# Patient Record
Sex: FEMALE | Race: WHITE | Employment: PART TIME | URBAN - NONMETROPOLITAN AREA
[De-identification: names, ages, dates, MRNs, and addresses within clinical notes are randomized per-mention and may not be internally consistent; named-entity substitution may affect disease eponyms.]

---

## 2023-03-11 ENCOUNTER — APPOINTMENT (OUTPATIENT)
Dept: CT IMAGING | Age: 23
End: 2023-03-11
Payer: COMMERCIAL

## 2023-03-11 ENCOUNTER — HOSPITAL ENCOUNTER (EMERGENCY)
Age: 23
Discharge: HOME | End: 2023-03-11
Payer: COMMERCIAL

## 2023-03-11 ENCOUNTER — HOSPITAL ENCOUNTER (EMERGENCY)
Facility: HOSPITAL | Age: 23
Discharge: LEFT BEFORE DISCHARGE | End: 2023-03-11
Payer: COMMERCIAL

## 2023-03-11 VITALS
OXYGEN SATURATION: 98 % | HEART RATE: 70 BPM | SYSTOLIC BLOOD PRESSURE: 116 MMHG | WEIGHT: 200 LBS | DIASTOLIC BLOOD PRESSURE: 69 MMHG | HEIGHT: 60 IN | BODY MASS INDEX: 39.27 KG/M2 | TEMPERATURE: 98.5 F | RESPIRATION RATE: 18 BRPM

## 2023-03-11 DIAGNOSIS — K59.00 CONSTIPATION, UNSPECIFIED CONSTIPATION TYPE: Primary | ICD-10-CM

## 2023-03-11 DIAGNOSIS — N83.202 CYSTS OF BOTH OVARIES: ICD-10-CM

## 2023-03-11 DIAGNOSIS — N83.201 CYSTS OF BOTH OVARIES: ICD-10-CM

## 2023-03-11 LAB
ADENOVIRUS F 40 41 PCR: NOT DETECTED
ALBUMIN SERPL-MCNC: 4.5 G/DL (ref 3.5–5.2)
ALP BLD-CCNC: 79 U/L (ref 35–104)
ALT SERPL-CCNC: 16 U/L (ref 5–33)
ANION GAP SERPL CALCULATED.3IONS-SCNC: 11 MMOL/L (ref 7–19)
AST SERPL-CCNC: 15 U/L (ref 5–32)
ASTROVIRUS PCR: NOT DETECTED
BACTERIA: NEGATIVE /HPF
BASOPHILS ABSOLUTE: 0 K/UL (ref 0–0.2)
BASOPHILS RELATIVE PERCENT: 0.4 % (ref 0–1)
BILIRUB SERPL-MCNC: 0.5 MG/DL (ref 0.2–1.2)
BILIRUBIN URINE: NEGATIVE
BLOOD, URINE: ABNORMAL
BUN BLDV-MCNC: 9 MG/DL (ref 6–20)
CALCIUM SERPL-MCNC: 9.6 MG/DL (ref 8.6–10)
CAMPYLOBACTER PCR: NOT DETECTED
CHLORIDE BLD-SCNC: 102 MMOL/L (ref 98–111)
CLARITY: ABNORMAL
CLOSTRIDIUM DIFFICILE, PCR: NOT DETECTED
CO2: 23 MMOL/L (ref 22–29)
COLOR: YELLOW
CREAT SERPL-MCNC: 0.7 MG/DL (ref 0.5–0.9)
CRYPTOSPORIDIUM PCR: NOT DETECTED
CRYSTALS, UA: ABNORMAL /HPF
CYCLOSPORA CAYETANENSIS PCR: NOT DETECTED
E COLI ENTEROAGGREGATIVE PCR: NOT DETECTED
E COLI ENTEROPATHOGENIC PCR: NOT DETECTED
E COLI ENTEROTOXIGENIC PCR: NOT DETECTED
E COLI SHIGELLA/ENTEROINVASIVE PCR: NOT DETECTED
ENTAMOEBA HISTOLYTICA PCR: NOT DETECTED
EOSINOPHILS ABSOLUTE: 0.1 K/UL (ref 0–0.6)
EOSINOPHILS RELATIVE PERCENT: 0.9 % (ref 0–5)
EPITHELIAL CELLS, UA: 17 /HPF (ref 0–5)
GFR SERPL CREATININE-BSD FRML MDRD: >60 ML/MIN/{1.73_M2}
GIARDIA LAMBLIA PCR: NOT DETECTED
GLUCOSE BLD-MCNC: 101 MG/DL (ref 74–109)
GLUCOSE URINE: NEGATIVE MG/DL
HCG QUALITATIVE: NEGATIVE
HCT VFR BLD CALC: 41.9 % (ref 37–47)
HEMOGLOBIN: 13.7 G/DL (ref 12–16)
HYALINE CASTS: 14 /HPF (ref 0–8)
IMMATURE GRANULOCYTES #: 0 K/UL
KETONES, URINE: ABNORMAL MG/DL
LEUKOCYTE ESTERASE, URINE: ABNORMAL
LIPASE: 19 U/L (ref 13–60)
LYMPHOCYTES ABSOLUTE: 1.9 K/UL (ref 1.1–4.5)
LYMPHOCYTES RELATIVE PERCENT: 28.1 % (ref 20–40)
MCH RBC QN AUTO: 28.5 PG (ref 27–31)
MCHC RBC AUTO-ENTMCNC: 32.7 G/DL (ref 33–37)
MCV RBC AUTO: 87.3 FL (ref 81–99)
MONOCYTES ABSOLUTE: 0.5 K/UL (ref 0–0.9)
MONOCYTES RELATIVE PERCENT: 7.7 % (ref 0–10)
NEUTROPHILS ABSOLUTE: 4.2 K/UL (ref 1.5–7.5)
NEUTROPHILS RELATIVE PERCENT: 62.6 % (ref 50–65)
NITRITE, URINE: NEGATIVE
NOROVIRUS GI GII PCR: NOT DETECTED
PDW BLD-RTO: 12.7 % (ref 11.5–14.5)
PH UA: 5.5 (ref 5–8)
PLATELET # BLD: 366 K/UL (ref 130–400)
PLESIOMONAS SHIGELLOIDES PCR: NOT DETECTED
PMV BLD AUTO: 9.6 FL (ref 9.4–12.3)
POTASSIUM REFLEX MAGNESIUM: 4 MMOL/L (ref 3.5–5)
PROTEIN UA: ABNORMAL MG/DL
RBC # BLD: 4.8 M/UL (ref 4.2–5.4)
RBC UA: 6 /HPF (ref 0–4)
ROTAVIRUS A PCR: NOT DETECTED
SALMONELLA PCR: NOT DETECTED
SAPOVIRUS PCR: NOT DETECTED
SHIGA-LIKE TOXIN-PRODUCING E. COLI (STEC) STX1/STX2: NOT DETECTED
SODIUM BLD-SCNC: 136 MMOL/L (ref 136–145)
SPECIFIC GRAVITY UA: 1.03 (ref 1–1.03)
TOTAL PROTEIN: 8.2 G/DL (ref 6.6–8.7)
UROBILINOGEN, URINE: 1 E.U./DL
VIBRIO CHOLERAE PCR: NOT DETECTED
VIBRIO PCR: NOT DETECTED
WBC # BLD: 6.7 K/UL (ref 4.8–10.8)
WBC UA: 11 /HPF (ref 0–5)
YERSINIA ENTEROCOLITICA PCR: NOT DETECTED

## 2023-03-11 PROCEDURE — 85025 COMPLETE CBC W/AUTO DIFF WBC: CPT

## 2023-03-11 PROCEDURE — 87086 URINE CULTURE/COLONY COUNT: CPT

## 2023-03-11 PROCEDURE — 99211 OFF/OP EST MAY X REQ PHY/QHP: CPT

## 2023-03-11 PROCEDURE — 81001 URINALYSIS AUTO W/SCOPE: CPT

## 2023-03-11 PROCEDURE — 87507 IADNA-DNA/RNA PROBE TQ 12-25: CPT

## 2023-03-11 PROCEDURE — 83690 ASSAY OF LIPASE: CPT

## 2023-03-11 PROCEDURE — 96374 THER/PROPH/DIAG INJ IV PUSH: CPT

## 2023-03-11 PROCEDURE — 99285 EMERGENCY DEPT VISIT HI MDM: CPT

## 2023-03-11 PROCEDURE — 84703 CHORIONIC GONADOTROPIN ASSAY: CPT

## 2023-03-11 PROCEDURE — 74177 CT ABD & PELVIS W/CONTRAST: CPT

## 2023-03-11 PROCEDURE — 6360000004 HC RX CONTRAST MEDICATION: Performed by: PHYSICIAN ASSISTANT

## 2023-03-11 PROCEDURE — 6370000000 HC RX 637 (ALT 250 FOR IP): Performed by: PHYSICIAN ASSISTANT

## 2023-03-11 PROCEDURE — 36415 COLL VENOUS BLD VENIPUNCTURE: CPT

## 2023-03-11 PROCEDURE — 6360000002 HC RX W HCPCS: Performed by: PHYSICIAN ASSISTANT

## 2023-03-11 PROCEDURE — 80053 COMPREHEN METABOLIC PANEL: CPT

## 2023-03-11 RX ORDER — BISACODYL 10 MG
10 SUPPOSITORY, RECTAL RECTAL ONCE
Status: COMPLETED | OUTPATIENT
Start: 2023-03-11 | End: 2023-03-11

## 2023-03-11 RX ORDER — KETOROLAC TROMETHAMINE 30 MG/ML
30 INJECTION, SOLUTION INTRAMUSCULAR; INTRAVENOUS ONCE
Status: COMPLETED | OUTPATIENT
Start: 2023-03-11 | End: 2023-03-11

## 2023-03-11 RX ORDER — METOCLOPRAMIDE 10 MG/1
10 TABLET ORAL 4 TIMES DAILY
Qty: 120 TABLET | Refills: 3 | Status: SHIPPED | OUTPATIENT
Start: 2023-03-11 | End: 2023-03-12 | Stop reason: SDUPTHER

## 2023-03-11 RX ORDER — KETOROLAC TROMETHAMINE 10 MG/1
10 TABLET, FILM COATED ORAL EVERY 6 HOURS PRN
Qty: 20 TABLET | Refills: 0 | Status: SHIPPED | OUTPATIENT
Start: 2023-03-11 | End: 2023-03-12 | Stop reason: SDUPTHER

## 2023-03-11 RX ORDER — HYDROCORTISONE ACETATE 25 MG/1
25 SUPPOSITORY RECTAL 2 TIMES DAILY
Status: DISCONTINUED | OUTPATIENT
Start: 2023-03-11 | End: 2023-03-12 | Stop reason: HOSPADM

## 2023-03-11 RX ORDER — POLYETHYLENE GLYCOL 3350 17 G/17G
17 POWDER, FOR SOLUTION ORAL DAILY PRN
Qty: 527 G | Refills: 1 | Status: SHIPPED | OUTPATIENT
Start: 2023-03-11 | End: 2023-03-12 | Stop reason: SDUPTHER

## 2023-03-11 RX ADMIN — HYDROCORTISONE ACETATE 25 MG: 25 SUPPOSITORY RECTAL at 19:54

## 2023-03-11 RX ADMIN — BISACODYL 10 MG: 10 SUPPOSITORY RECTAL at 20:27

## 2023-03-11 RX ADMIN — IOPAMIDOL 90 ML: 755 INJECTION, SOLUTION INTRAVENOUS at 18:45

## 2023-03-11 RX ADMIN — KETOROLAC TROMETHAMINE 30 MG: 30 INJECTION, SOLUTION INTRAMUSCULAR at 19:34

## 2023-03-11 ASSESSMENT — PAIN SCALES - GENERAL: PAINLEVEL_OUTOF10: 10

## 2023-03-11 ASSESSMENT — ENCOUNTER SYMPTOMS
COLOR CHANGE: 0
RHINORRHEA: 0
SHORTNESS OF BREATH: 0
NAUSEA: 0
BLOOD IN STOOL: 1
COUGH: 0
ABDOMINAL PAIN: 1
SORE THROAT: 0
APNEA: 0
EYE PAIN: 0
EYE DISCHARGE: 0
PHOTOPHOBIA: 0
ABDOMINAL DISTENTION: 0
BACK PAIN: 0

## 2023-03-11 ASSESSMENT — PAIN DESCRIPTION - DESCRIPTORS: DESCRIPTORS: ACHING;BURNING;SHOOTING

## 2023-03-11 ASSESSMENT — PAIN DESCRIPTION - LOCATION: LOCATION: ABDOMEN

## 2023-03-11 ASSESSMENT — PAIN DESCRIPTION - ORIENTATION: ORIENTATION: LOWER

## 2023-03-11 ASSESSMENT — PAIN DESCRIPTION - FREQUENCY: FREQUENCY: CONTINUOUS

## 2023-03-11 ASSESSMENT — PAIN - FUNCTIONAL ASSESSMENT: PAIN_FUNCTIONAL_ASSESSMENT: 0-10

## 2023-03-11 ASSESSMENT — PAIN DESCRIPTION - PAIN TYPE: TYPE: ACUTE PAIN

## 2023-03-12 LAB — URINE CULTURE, ROUTINE: NORMAL

## 2023-03-12 RX ORDER — POLYETHYLENE GLYCOL 3350 17 G/17G
17 POWDER, FOR SOLUTION ORAL DAILY PRN
Qty: 527 G | Refills: 1 | Status: SHIPPED | OUTPATIENT
Start: 2023-03-12 | End: 2023-04-11

## 2023-03-12 RX ORDER — METOCLOPRAMIDE 10 MG/1
10 TABLET ORAL 4 TIMES DAILY
Qty: 120 TABLET | Refills: 3 | Status: SHIPPED | OUTPATIENT
Start: 2023-03-12 | End: 2023-03-12 | Stop reason: SDUPTHER

## 2023-03-12 RX ORDER — KETOROLAC TROMETHAMINE 10 MG/1
10 TABLET, FILM COATED ORAL EVERY 6 HOURS PRN
Qty: 20 TABLET | Refills: 0 | Status: SHIPPED | OUTPATIENT
Start: 2023-03-12

## 2023-03-12 RX ORDER — METOCLOPRAMIDE 10 MG/1
10 TABLET ORAL 4 TIMES DAILY
Qty: 120 TABLET | Refills: 3 | Status: SHIPPED | OUTPATIENT
Start: 2023-03-12

## 2023-03-12 RX ORDER — KETOROLAC TROMETHAMINE 10 MG/1
10 TABLET, FILM COATED ORAL EVERY 6 HOURS PRN
Qty: 20 TABLET | Refills: 0 | Status: SHIPPED | OUTPATIENT
Start: 2023-03-12 | End: 2023-03-12 | Stop reason: SDUPTHER

## 2023-03-12 RX ORDER — POLYETHYLENE GLYCOL 3350 17 G/17G
17 POWDER, FOR SOLUTION ORAL DAILY PRN
Qty: 527 G | Refills: 1 | Status: SHIPPED | OUTPATIENT
Start: 2023-03-12 | End: 2023-03-12 | Stop reason: SDUPTHER

## 2023-03-12 NOTE — ED PROVIDER NOTES
[address] [facility] EMERGENCY DEPT  eMERGENCYdEPARTMENT eNCOUnter      Pt Name: Feli Hamm  MRN: [id number]  [place] 2000  Date of evaluation: 3/11/2023  Provider:DONI Tapia    CHIEF COMPLAINT       Chief Complaint   Patient presents with    Abdominal Pain     Pt reports starting period last night. She has had excruciating pain since 2pm. Pt has also had usual urination issues. Rectal Bleeding     Pt reports having dark stools, along with dark blood. Pt believes she had hemorrhoids a month ago. HISTORY OF PRESENT ILLNESS  (Location/Symptom, Timing/Onset, Context/Setting, Quality, Duration, Modifying Factors, Severity.)   Feli Hamm is a 23 y.o. female who presents to the emergency department with complaints of lower abdominal pain RLQ she is prone to constipation currently on her period states she can have very painful menses this feels different. She has had recent painful bm with some blood in stool that is resolving. She denies fatigue chills fever. Normal intake. Denies vaginal discharge. Denies flank pain. HPI    Nursing Notes were reviewed and I agree. REVIEW OF SYSTEMS    (2-9 systems for level 4, 10 or more for level 5)     Review of Systems   Constitutional:  Negative for activity change, appetite change, chills and fever. HENT:  Negative for congestion, postnasal drip, rhinorrhea and sore throat. Eyes:  Negative for photophobia, pain, discharge and visual disturbance. Respiratory:  Negative for apnea, cough and shortness of breath. Cardiovascular:  Negative for chest pain and leg swelling. Gastrointestinal:  Positive for abdominal pain and blood in stool. Negative for abdominal distention and nausea. Genitourinary:  Negative for vaginal bleeding. Musculoskeletal:  Negative for arthralgias, back pain, joint swelling, neck pain and neck stiffness. Skin:  Negative for color change and rash. Neurological:  Negative for dizziness, syncope, facial asymmetry and headaches. Hematological:  Negative for adenopathy. Does not bruise/bleed easily. Psychiatric/Behavioral:  Negative for agitation, behavioral problems and confusion. Except as noted above the remainder of the review of systems was reviewed and negative. PAST MEDICAL HISTORY   No past medical history on file. SURGICAL HISTORY     No past surgical history on file. CURRENT MEDICATIONS       Discharge Medication List as of 3/11/2023  8:43 PM          ALLERGIES     Patient has no known allergies. FAMILY HISTORY     No family history on file. SOCIAL HISTORY       Social History     Socioeconomic History    Marital status: Single       SCREENINGS    Max Coma Scale  Eye Opening: Spontaneous  Best Verbal Response: Oriented  Best Motor Response: Obeys commands  Max Coma Scale Score: 15      PHYSICAL EXAM    (up to 7 forlevel 4, 8 or more for level 5)     ED Triage Vitals   BP Temp Temp src Heart Rate Resp SpO2 Height Weight   03/11/23 1732 03/11/23 1732 -- 03/11/23 1732 03/11/23 1732 03/11/23 1732 03/11/23 1730 03/11/23 1730   116/69 98.5 °F (36.9 °C)  70 18 98 % 5' (1.524 m) 200 lb (90.7 kg)       Physical Exam  Vitals and nursing note reviewed. Constitutional:       General: She is not in acute distress. Appearance: She is well-developed. She is not diaphoretic. HENT:      Head: Normocephalic and atraumatic. Right Ear: External ear normal.      Left Ear: External ear normal.      Mouth/Throat:      Pharynx: No oropharyngeal exudate. Eyes:      General:         Right eye: No discharge. Left eye: No discharge. Pupils: Pupils are equal, round, and reactive to light. Neck:      Thyroid: No thyromegaly. Cardiovascular:      Rate and Rhythm: Normal rate and regular rhythm. Heart sounds: Normal heart sounds. No murmur heard. No friction rub. Pulmonary:      Effort: Pulmonary effort is normal. No respiratory distress. Breath sounds: Normal breath sounds. No stridor. No wheezing. Abdominal:      General: Bowel sounds are normal. There is no distension. Palpations: Abdomen is soft. Tenderness: There is no abdominal tenderness. Genitourinary:     Rectum: Tenderness present. Comments: No fissure or tear no external hemorrhoids she declines when offering digital exam. She has no inflammatory finding son scan including hemmorhoid read. Musculoskeletal:         General: Normal range of motion. Cervical back: Normal range of motion and neck supple. Skin:     General: Skin is warm and dry. Capillary Refill: Capillary refill takes less than 2 seconds. Findings: No rash. Neurological:      Mental Status: She is alert and oriented to person, place, and time. Cranial Nerves: No cranial nerve deficit. Sensory: No sensory deficit. Coordination: Coordination normal.   Psychiatric:         Behavior: Behavior normal.         Thought Content: Thought content normal.         DIAGNOSTIC RESULTS     RADIOLOGY:   Non-plain film images such as CT, Ultrasound and MRI are read by the radiologist. Plain radiographic images are visualized and preliminarilyinterpreted by No att. providers found with the below findings:        Interpretation per the Radiologist below, if available at the time of this note:    CT ABDOMEN PELVIS W IV CONTRAST Additional Contrast? None   Final Result   No acute intra-abdominal/pelvic process evident.           LABS:  Labs Reviewed   URINALYSIS WITH REFLEX TO CULTURE - Abnormal; Notable for the following components:       Result Value    Clarity, UA CLOUDY (*)     Ketones, Urine TRACE (*)     Blood, Urine MODERATE (*)     Protein, UA TRACE (*)     Leukocyte Esterase, Urine SMALL (*)     All other components within normal limits   CBC WITH AUTO DIFFERENTIAL - Abnormal; Notable for the following components:    MCHC 32.7 (*)     All other components within normal limits   MICROSCOPIC URINALYSIS - Abnormal; Notable for the following components:    Bacteria, UA NEGATIVE (*)     Crystals, UA NEG (*)     Hyaline Casts, UA 14 (*)     WBC, UA 11 (*)     RBC, UA 6 (*)     All other components within normal limits   GASTROINTESTINAL PANEL, MOLECULAR   CULTURE, URINE   LIPASE   HCG, SERUM, QUALITATIVE   COMPREHENSIVE METABOLIC PANEL W/ REFLEX TO MG FOR LOW K       All other labs were within normal range or notreturned as of this dictation. RE-ASSESSMENT          EMERGENCY DEPARTMENT COURSE and DIFFERENTIAL DIAGNOSIS/MDM:   Vitals:    Vitals:    03/11/23 1730 03/11/23 1732   BP:  116/69   Pulse:  70   Resp:  18   Temp:  98.5 °F (36.9 °C)   SpO2:  98%   Weight: 200 lb (90.7 kg)    Height: 5' (1.524 m)          MDM  Plan for discharge with plan for constipation tx and follow with OBGYN for hormonal tx based on painful menses concern for ovarian cyst. Has no sign of external hemmorhoid or tear. Plan for discharge. PROCEDURES:    Procedures      FINAL IMPRESSION      1. Constipation, unspecified constipation type    2. Cysts of both ovaries          DISPOSITION/PLAN   DISPOSITION Decision To Discharge 03/11/2023 10:24:38 PM      PATIENT REFERRED TO:  No follow-up provider specified.     DISCHARGE MEDICATIONS:  Discharge Medication List as of 3/11/2023  8:43 PM        START taking these medications    Details   metoclopramide (REGLAN) 10 MG tablet Take 1 tablet by mouth 4 times daily, Disp-120 tablet, R-3Print      ketorolac (TORADOL) 10 MG tablet Take 1 tablet by mouth every 6 hours as needed for Pain, Disp-20 tablet, R-0Print      polyethylene glycol (MIRALAX) 17 g packet Take 17 g by mouth daily as needed for Constipation, Disp-527 g, R-1Print             (Please note that portions of this note were completed with a voice recognition program.  Efforts were made to edit the dictations but occasionallywords are mis-transcribed.)    Ondina Esquivel, [address] [place] [address], [address] [place] [address]  03/11/23 6117

## 2023-03-12 NOTE — ED NOTES
Gordon Ferreira and RN at bedside for rectal exam. No external hemorrhoids or blood noted.        Sandra Alexander RN  03/11/23 1940

## 2023-03-12 NOTE — ED NOTES
Pt states she would like to stay because she does not want to get home and be in pain, or not have a bm.       Oliver Arreguin RN  03/11/23 [id number]

## 2023-03-13 LAB — URINE CULTURE, ROUTINE: NORMAL

## 2023-03-28 ENCOUNTER — OFFICE VISIT (OUTPATIENT)
Dept: PRIMARY CARE CLINIC | Age: 23
End: 2023-03-28
Payer: COMMERCIAL

## 2023-03-28 VITALS
SYSTOLIC BLOOD PRESSURE: 124 MMHG | RESPIRATION RATE: 16 BRPM | BODY MASS INDEX: 41.98 KG/M2 | HEART RATE: 100 BPM | DIASTOLIC BLOOD PRESSURE: 84 MMHG | OXYGEN SATURATION: 98 % | TEMPERATURE: 98.5 F | WEIGHT: 213.8 LBS | HEIGHT: 60 IN

## 2023-03-28 DIAGNOSIS — R00.0 TACHYCARDIA: ICD-10-CM

## 2023-03-28 DIAGNOSIS — Z87.74 STATUS POST REPAIR OF TETRALOGY OF FALLOT: ICD-10-CM

## 2023-03-28 DIAGNOSIS — L67.8 ABNORMAL FACIAL HAIR: ICD-10-CM

## 2023-03-28 DIAGNOSIS — K59.09 OTHER CONSTIPATION: ICD-10-CM

## 2023-03-28 DIAGNOSIS — R04.0 BLEEDING NOSE: ICD-10-CM

## 2023-03-28 DIAGNOSIS — N94.6 DYSMENORRHEA: ICD-10-CM

## 2023-03-28 DIAGNOSIS — L70.0 ACNE VULGARIS: ICD-10-CM

## 2023-03-28 DIAGNOSIS — N92.6 IRREGULAR MENSES: ICD-10-CM

## 2023-03-28 LAB
ESTRADIOL SERPL-SCNC: 114 PG/ML
HBA1C MFR BLD: 5 % (ref 4–6)
PROGEST SERPL-MCNC: 1.21 NG/ML

## 2023-03-28 PROCEDURE — 93000 ELECTROCARDIOGRAM COMPLETE: CPT | Performed by: NURSE PRACTITIONER

## 2023-03-28 PROCEDURE — 99204 OFFICE O/P NEW MOD 45 MIN: CPT | Performed by: NURSE PRACTITIONER

## 2023-03-28 RX ORDER — ISOTRETINOIN 40 MG/1
40 CAPSULE ORAL 2 TIMES DAILY
Qty: 60 CAPSULE | Refills: 0 | COMMUNITY
Start: 2023-03-28

## 2023-03-28 RX ORDER — GREEN TEA/HOODIA GORDONII 315-12.5MG
1 CAPSULE ORAL 2 TIMES DAILY
Qty: 60 TABLET | Refills: 0 | Status: SHIPPED | OUTPATIENT
Start: 2023-03-28 | End: 2023-04-27

## 2023-03-28 RX ORDER — ISOTRETINOIN 40 MG/1
1 CAPSULE ORAL EVERY 12 HOURS SCHEDULED
COMMUNITY
Start: 2023-01-03 | End: 2023-03-28 | Stop reason: ALTCHOICE

## 2023-03-28 RX ORDER — ISOTRETINOIN 40 MG/1
40 CAPSULE ORAL 2 TIMES DAILY
COMMUNITY
Start: 2023-03-21 | End: 2023-03-28 | Stop reason: ALTCHOICE

## 2023-03-28 SDOH — ECONOMIC STABILITY: FOOD INSECURITY: WITHIN THE PAST 12 MONTHS, YOU WORRIED THAT YOUR FOOD WOULD RUN OUT BEFORE YOU GOT MONEY TO BUY MORE.: NEVER TRUE

## 2023-03-28 SDOH — ECONOMIC STABILITY: HOUSING INSECURITY
IN THE LAST 12 MONTHS, WAS THERE A TIME WHEN YOU DID NOT HAVE A STEADY PLACE TO SLEEP OR SLEPT IN A SHELTER (INCLUDING NOW)?: NO

## 2023-03-28 SDOH — ECONOMIC STABILITY: FOOD INSECURITY: WITHIN THE PAST 12 MONTHS, THE FOOD YOU BOUGHT JUST DIDN'T LAST AND YOU DIDN'T HAVE MONEY TO GET MORE.: NEVER TRUE

## 2023-03-28 SDOH — ECONOMIC STABILITY: INCOME INSECURITY: HOW HARD IS IT FOR YOU TO PAY FOR THE VERY BASICS LIKE FOOD, HOUSING, MEDICAL CARE, AND HEATING?: NOT VERY HARD

## 2023-03-28 ASSESSMENT — PATIENT HEALTH QUESTIONNAIRE - PHQ9
2. FEELING DOWN, DEPRESSED OR HOPELESS: 1
1. LITTLE INTEREST OR PLEASURE IN DOING THINGS: 1
SUM OF ALL RESPONSES TO PHQ9 QUESTIONS 1 & 2: 2
SUM OF ALL RESPONSES TO PHQ QUESTIONS 1-9: 2

## 2023-03-28 NOTE — PROGRESS NOTES
yrs) 1 1/2 capfuls 3/4 capful    lbs (12-14 yrs) 1 3/4 capfuls 3/4 capful   110-154 lbs (14-16 yrs) 2 capfuls 1 capful   >154 lbs (16 yrs and older) 2 1/2 capfuls 1 capful     On days with hard stools, try over-the-counter ex lax for faster results to clear stool. Can also try suppositories as tolerated for better and quicker results (dulcolax, glycerin). Patient given educational materials -see patient instructions. Discussed use, benefit, and side effects of prescribed medications. All patient questions answered. Pt voiced understanding. Reviewed health maintenance. Instructed to continue currentmedications, diet and exercise. Patient agreed with treatment plan. Follow up as directed. MEDICATIONS:  Orders Placed This Encounter   Medications    Probiotic Acidophilus (FLORANEX) TABS     Sig: Take 1 tablet by mouth 2 times daily     Dispense:  60 tablet     Refill:  0         ORDERS:  Orders Placed This Encounter   Procedures    Insulin Free & Total    Testosterone Free and Total, Non-Male    Estradiol    Progesterone    Hemoglobin A1C    External Referral To Cardiology    EKG 12 Lead - Clinic Performed       Follow-up:  Return in about 4 weeks (around 4/25/2023). PATIENT INSTRUCTIONS:  Patient Instructions    Constipation     Constipation/hard stool exacerbation of urinary symptoms:  - goal to achieve daily BM, type #5-6, soft on the [facility] Stool Chart (below)  - Diet modification (Increased water intake, P-fruits, increase green veggies, less sweet/sstarchy foods, decrease dairy)  - Twice per day \"sitting on toilet\" to have bowel movement  - Clean out to treat constipation that is currently symptomatic or evident on x-ray. - Miralax as below    Increase fluid intake.    Child's weight  (approximate age) Daily liquid Intake  (prefer clear)   22-33 lbs (2 yrs) 15-20 oz   34-44 lbs (3-4 yrs) 20-25 oz   45-55 lbs (5-6 yrs) 25-30 oz   56-66 lbs (7-8 yrs) 30-35 oz   66-88 lbs (9-11 yrs) 35-45 oz

## 2023-03-28 NOTE — PATIENT INSTRUCTIONS
(+/-)   22-33 lbs (2 yrs) 1/2 capful 1/4 capful   34-44 lbs (3-4 yrs) 3/4 capful 1/4 capful   45-55 lbs (5-6 yrs) 1 capful 1/2 capful   56-66 lbs (7-8 yrs) 1 1/4 capfuls 1/2 capful   66-88 lbs (9-11 yrs) 1 1/2 capfuls 3/4 capful    lbs (12-14 yrs) 1 3/4 capfuls 3/4 capful   110-154 lbs (14-16 yrs) 2 capfuls 1 capful   >154 lbs (16 yrs and older) 2 1/2 capfuls 1 capful     On days with hard stools, try over-the-counter ex lax for faster results to clear stool. Can also try suppositories as tolerated for better and quicker results (dulcolax, glycerin). BMR    10 x (weight in kilogram) + 6.25 (height in centimeters) - 5 (age) - 80 = BMR    The fundamentals of weight loss come down to the fact that calories in have to be less than calories out which results in a calorie deficit. However healthy weight loss will also require you to meet your basal metabolic rate. While some of us eat way too many calories, others eat way too few causing the body to store most intake into fat cells. Increase water intake with a goal of 1 gallon per day. Download a calorie counting sonu such as my fitness pal or other program.    Keep a journal of everything that you eat or drink for 2 weeks. At the end of each day calculate your caloric intake. This gives you knowledge of what you are actually getting in each day. By calculating your BMR you will know the amount of calories that your body needs every day to function appropriately. Focus on making better nutritional choices, eliminate simple sugars or sugary drinks such as soda or tea. Limit starchy vegetables such as potatoes or corn. Use healthier choices of breads such as Kin bread, low-carb wraps or keto bread. Eating plans such as the [address] [place] [address] or [facility] can be implemented to help guide eating habits. Slowly increase physical exercise to include 20 to 30 minutes of moderate exercise 3 to 5 days a week.

## 2023-04-01 LAB
INSULIN FREE SERPL-ACNC: 39 UIU/ML (ref 3–25)
INSULIN SERPL-ACNC: 52 UIU/ML (ref 3–25)

## 2023-04-04 PROBLEM — Z87.74 STATUS POST REPAIR OF TETRALOGY OF FALLOT: Status: ACTIVE | Noted: 2023-04-04

## 2023-04-04 ASSESSMENT — ENCOUNTER SYMPTOMS
EYES NEGATIVE: 1
GASTROINTESTINAL NEGATIVE: 1
DIARRHEA: 0
SHORTNESS OF BREATH: 0
COUGH: 0
CONSTIPATION: 0
RESPIRATORY NEGATIVE: 1
ALLERGIC/IMMUNOLOGIC NEGATIVE: 1
ABDOMINAL PAIN: 0
VOMITING: 0
WHEEZING: 0
NAUSEA: 0

## 2023-04-05 LAB
SHBG SERPL-SCNC: 17 NMOL/L (ref 25–122)
TESTOST FREE SERPL-MCNC: 12.7 PG/ML (ref 0.8–7.4)
TESTOST SERPL-MCNC: 56 NG/DL (ref 9–55)

## 2023-04-06 PROBLEM — K59.09 OTHER CONSTIPATION: Status: ACTIVE | Noted: 2023-04-06

## 2023-04-11 ENCOUNTER — OFFICE VISIT (OUTPATIENT)
Dept: PRIMARY CARE CLINIC | Age: 23
End: 2023-04-11
Payer: COMMERCIAL

## 2023-04-11 VITALS
HEART RATE: 80 BPM | WEIGHT: 216 LBS | RESPIRATION RATE: 18 BRPM | SYSTOLIC BLOOD PRESSURE: 130 MMHG | TEMPERATURE: 97.4 F | HEIGHT: 60 IN | BODY MASS INDEX: 42.41 KG/M2 | OXYGEN SATURATION: 99 % | DIASTOLIC BLOOD PRESSURE: 86 MMHG

## 2023-04-11 DIAGNOSIS — E66.01 MORBID OBESITY (HCC): ICD-10-CM

## 2023-04-11 DIAGNOSIS — F41.9 ANXIETY: Primary | ICD-10-CM

## 2023-04-11 DIAGNOSIS — F32.A DEPRESSION, UNSPECIFIED DEPRESSION TYPE: ICD-10-CM

## 2023-04-11 PROCEDURE — 99214 OFFICE O/P EST MOD 30 MIN: CPT | Performed by: NURSE PRACTITIONER

## 2023-04-11 RX ORDER — ESCITALOPRAM OXALATE 10 MG/1
10 TABLET ORAL DAILY
Qty: 90 TABLET | Refills: 1 | Status: SHIPPED | OUTPATIENT
Start: 2023-04-11

## 2023-04-11 RX ORDER — BUSPIRONE HYDROCHLORIDE 5 MG/1
5 TABLET ORAL 3 TIMES DAILY
Qty: 90 TABLET | Refills: 0 | Status: SHIPPED | OUTPATIENT
Start: 2023-04-11 | End: 2023-05-11

## 2023-04-11 ASSESSMENT — ENCOUNTER SYMPTOMS
NAUSEA: 0
ALLERGIC/IMMUNOLOGIC NEGATIVE: 1
RESPIRATORY NEGATIVE: 1
EYES NEGATIVE: 1
VOMITING: 0
ABDOMINAL PAIN: 0
CONSTIPATION: 0
DIARRHEA: 0
WHEEZING: 0
SHORTNESS OF BREATH: 0
GASTROINTESTINAL NEGATIVE: 1
COUGH: 0

## 2023-04-11 ASSESSMENT — PATIENT HEALTH QUESTIONNAIRE - PHQ9
SUM OF ALL RESPONSES TO PHQ9 QUESTIONS 1 & 2: 2
SUM OF ALL RESPONSES TO PHQ QUESTIONS 1-9: 2
SUM OF ALL RESPONSES TO PHQ QUESTIONS 1-9: 2
1. LITTLE INTEREST OR PLEASURE IN DOING THINGS: 1
2. FEELING DOWN, DEPRESSED OR HOPELESS: 1
SUM OF ALL RESPONSES TO PHQ QUESTIONS 1-9: 2
SUM OF ALL RESPONSES TO PHQ QUESTIONS 1-9: 2

## 2023-04-14 ENCOUNTER — TELEPHONE (OUTPATIENT)
Dept: CARDIOLOGY | Facility: CLINIC | Age: 23
End: 2023-04-14
Payer: COMMERCIAL

## 2023-04-14 NOTE — TELEPHONE ENCOUNTER
Called [facility] at [phone] to request event monitor result and was told to fax a request to [phone] which was faxed.  Yessenia Armstrong MA

## 2023-04-17 ENCOUNTER — LAB (OUTPATIENT)
Dept: LAB | Facility: HOSPITAL | Age: 23
End: 2023-04-17
Payer: COMMERCIAL

## 2023-04-17 ENCOUNTER — OFFICE VISIT (OUTPATIENT)
Dept: CARDIOLOGY | Facility: CLINIC | Age: 23
End: 2023-04-17
Payer: COMMERCIAL

## 2023-04-17 VITALS
DIASTOLIC BLOOD PRESSURE: 78 MMHG | WEIGHT: 217 LBS | HEIGHT: 62 IN | SYSTOLIC BLOOD PRESSURE: 128 MMHG | OXYGEN SATURATION: 98 % | HEART RATE: 60 BPM | BODY MASS INDEX: 39.93 KG/M2

## 2023-04-17 DIAGNOSIS — R00.2 PALPITATIONS: ICD-10-CM

## 2023-04-17 DIAGNOSIS — Z87.74 TETRALOGY OF FALLOT S/P REPAIR: Primary | ICD-10-CM

## 2023-04-17 DIAGNOSIS — G43.909 MIGRAINE WITHOUT STATUS MIGRAINOSUS, NOT INTRACTABLE, UNSPECIFIED MIGRAINE TYPE: ICD-10-CM

## 2023-04-17 PROBLEM — E66.01 MORBID OBESITY (HCC): Status: ACTIVE | Noted: 2023-04-17

## 2023-04-17 LAB
ANION GAP SERPL CALCULATED.3IONS-SCNC: 12 MMOL/L (ref 5–15)
BUN SERPL-MCNC: 11 MG/DL (ref 6–20)
BUN/CREAT SERPL: 16.2 (ref 7–25)
CALCIUM SPEC-SCNC: 9.7 MG/DL (ref 8.6–10.5)
CHLORIDE SERPL-SCNC: 100 MMOL/L (ref 98–107)
CO2 SERPL-SCNC: 24 MMOL/L (ref 22–29)
CREAT SERPL-MCNC: 0.68 MG/DL (ref 0.57–1)
EGFRCR SERPLBLD CKD-EPI 2021: 128 ML/MIN/1.73
GLUCOSE SERPL-MCNC: 91 MG/DL (ref 65–99)
MAGNESIUM SERPL-MCNC: 2.2 MG/DL (ref 1.7–2.2)
POTASSIUM SERPL-SCNC: 3.9 MMOL/L (ref 3.5–5.2)
SODIUM SERPL-SCNC: 136 MMOL/L (ref 136–145)

## 2023-04-17 PROCEDURE — 83735 ASSAY OF MAGNESIUM: CPT

## 2023-04-17 PROCEDURE — 36415 COLL VENOUS BLD VENIPUNCTURE: CPT

## 2023-04-17 PROCEDURE — 80048 BASIC METABOLIC PNL TOTAL CA: CPT

## 2023-04-17 PROCEDURE — 84443 ASSAY THYROID STIM HORMONE: CPT

## 2023-04-17 PROCEDURE — 84479 ASSAY OF THYROID (T3 OR T4): CPT

## 2023-04-17 PROCEDURE — 84436 ASSAY OF TOTAL THYROXINE: CPT

## 2023-04-17 RX ORDER — PROPRANOLOL HYDROCHLORIDE 20 MG/1
20 TABLET ORAL 2 TIMES DAILY
Qty: 60 TABLET | Refills: 11 | Status: SHIPPED | OUTPATIENT
Start: 2023-04-17

## 2023-04-17 RX ORDER — ESCITALOPRAM OXALATE 10 MG/1
1 TABLET ORAL DAILY
COMMUNITY
Start: 2023-04-11

## 2023-04-17 RX ORDER — BUSPIRONE HYDROCHLORIDE 5 MG/1
1 TABLET ORAL 3 TIMES DAILY
COMMUNITY
Start: 2023-04-11

## 2023-04-17 NOTE — PROGRESS NOTES
Reason For Visit:  Palpitations/tachycardia    Subjective        Dayana Fuentes is a 22 y.o. female with a PMH of tetralogy of Fallot s/p valve sparing repair who is referred for palpitations and her congenital heart disease.    Patient recently established care with new PCP and requested referral to local cardiology.  Per notes, she had been concerned about elevated HR.  ECG in office demonstrated sinus rhythm at 97 bpm with RBBB.    On record review, patient previously followed at [place] [facility] for her history of TOF s/p repair.  She had expressed concern to them regarding palpitations and more elevated HR prompting a 30-day event monitor that was worn at the end of 2022.  I have summarized the pertinent findings below, which demonstrated no significant arrhythmias and average HR 80 bpm with symptoms primarily correlating to sinus rhythm and sinus tachycardia.  She maintained stress about her elevated HR and was bothered by being told that her monitor looked fine given that she has continued to have elevated HR issues.  She does report that the symptoms have remained stable since she wore a cardiac monitor.  Her primary symptoms are random elevations in HR associated with palpitations and shortness of breath that can sometimes happen with light activity but also occur at rest or when she is laying down.  She does occasionally have significant anxiety/panic attacks when this occurs, but she feels like those issues are triggered by the elevated HR rather than the other way around.    On review of her [place] records, it appears that they were trying to obtain a cardiac MRI as well as a treadmill stress test.  They were in the process of scheduling her for a transitions visit to moved to the adult congenital heart disease group.  The patient was having issues with scheduling multiple appointments as they wanted her to be seen on different days for her MRI, pediatric cardiology visit, and transition  "visit; due to her schedule, she did not feel that she can make this many trips to [place].  She ultimately did not have any further visits and wanted to try to find local cardiology care.    Other than her tachycardia/palpitations, the patient ports that she has been feeling well from a cardiac perspective.  She denies any consistent exertional chest discomfort or shortness of breath.  She has not had any orthopnea or lower extremity edema.  She has been gaining weight and was recently diagnosed with PCOS that is being managed by her PCP.  She also has been having frequent migraines every 2 to 3 days.    Pertinent cardiac studies from [place] records are included below.    ROS: Pertinent findings are included above.    Pertinent past medical, surgical, family, and social history were reviewed and updated in the EMR.      Current Outpatient Medications:   •  busPIRone (BUSPAR) 5 MG tablet, Take 1 tablet by mouth 3 (Three) Times a Day., Disp: , Rfl:   •  Claravis 40 MG capsule, Take 1 capsule by mouth Every 12 (Twelve) Hours., Disp: , Rfl:   •  escitalopram (LEXAPRO) 10 MG tablet, Take 1 tablet by mouth Daily., Disp: , Rfl:   •  metFORMIN (GLUCOPHAGE) 500 MG tablet, Take 1 tablet by mouth., Disp: , Rfl:       Objective   Vital Signs:  /78   Pulse 60   Ht 157.5 cm (62\")   Wt 98.4 kg (217 lb)   SpO2 98%   BMI 39.69 kg/m²   Estimated body mass index is 39.69 kg/m² as calculated from the following:    Height as of this encounter: 157.5 cm (62\").    Weight as of this encounter: 98.4 kg (217 lb).      Constitutional:       Appearance: Healthy appearance. Not in distress.   Neck:      Vascular: JVD normal.   Pulmonary:      Effort: Pulmonary effort is normal.      Breath sounds: Normal breath sounds.   Cardiovascular:      Normal rate. Regular rhythm. S2. Mild pulmonic component of S2.       Murmurs: There is a grade 3/6 crescendo-decrescendo systolic murmur at the ULSB.      No gallop. No click. No rub. " "  Edema:     Peripheral edema absent.   Abdominal:      General: There is no distension.      Palpations: Abdomen is soft.      Tenderness: There is no abdominal tenderness.   Skin:     General: Skin is warm and dry.   Neurological:      Mental Status: Alert and oriented to person, place and time.        Result Review :  The following data was reviewed by: Tonio Chung MD on 04/17/2023:  TSH     BMP   BMP        4/17/2023    15:07   BMP   BUN 11     Creatinine 0.68     Sodium 136     Potassium 3.9     Chloride 100     CO2 24.0     Calcium 9.7       Magnesium   Lab Results - Last 18 Months   Lab Units 04/17/23  1507   MAGNESIUM mg/dL 2.2     Free T4 No results for input(s): FREET4 in the last 86539 hours.     TSH and free T4 pending         ECG 12 Lead    Date/Time: 4/17/2023 5:31 PM  Performed by: Tonio Chung MD  Authorized by: Tonio Chung MD   Comparison: compared with previous ECG from 3/28/2023  Comparison to previous ECG: T wave amplitude is increased  Rhythm: sinus bradycardia  Conduction: right bundle branch block  Other findings: non-specific ST-T wave changes    Clinical impression: abnormal EKG           Summary of cardiac monitor 11/29- 12/28/2022  Monitor revealed overall average HR 80 bpm ranging from 47 to 165 bpm with predominantly sinus rhythm.  No abnormal rhythms were identified.  There were 13 total patient transmissions.  6 were identified as \"no symptoms/accidental push\".  These correlated to sinus rhythm and sinus tachycardia ranging from 70 to 127 bpm.  6 transmissions were for dizziness, lightheadedness, flutter, skipped beats, or shortness of breath; rhythm strips demonstrated sinus tachycardia with rates of  bpm.  There was 1 transmission for tired/fatigued, flutter/skipped beats, rapid rate that demonstrated sinus bradycardia with rate 57-77 bpm.    Pertinent studies taken from her most recent visit with pediatric cardiology at [place];  1. Original anatomy:. Tetralogy of " Fallot.  2. Present status   -----Mild RA and mild to moderate RV enlargement.  -----Mild pulmonary stenosis : peak 26mmHg gradient;   -----Mild pulmonary valve insufficiency  -----Normal chamber sizes.  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  -----history of NSVT on screening holter 2022 (triplet)  3. Significant interventions/evaluations  -----S/p valve sparing repair of Tetralogy of Fallot 2/2003 ([facility])  -----MRI 3/2013 Mild LPA stenosis with asymmetric blood flow (36% of flow to LPA; 64% to RPA; anticipate repeat 2014-15) RVEDV 86 cc/m2 and RVEF 48%; LVEDV 64 cc/m2; LVEF 69%.  -----MRI 7/2016: Normal RV size RVEDV 110cc/m2; RVEF 50%; Normal LV size 96 ml/m2 and LVEF 66%. Trivial pulmonary insufficiency. Mild MPA dilation. LPA take off is superior and mildly narrowed. Differential pulmonary blood flow: 36% to the left and 64% to the right, unchanged compared to prior  -----MRI 2020: Mild RA dilation Mild RV dilation 125 cc/m2 (previous 110cc/m2); RVEF 55%; Normal LV size 96 ml/m2 and LVEF 62%. Trivial pulmonary insufficiency.Mild narrowing of LPA L. Differential pulmonary blood flow: 40% to the left and 60% to the right, Mild dilated aortic root and ascending aorta.   4. Rhythm/exercise assessments  -----Abnormal ECG with LAD RBBB and QRS duration 160msec  -----Holter 2013 Baseline; NSR rare premature atrial beats; no significant ectopy  -----Holter 2017 NSR and atrial rhythms heart rate range 49-171bpm; rare pac's and PVC's  -----Holter 2022 NSR (mean HR 90bpm range  bpm) and atrial rhythms (range 52-91 bpm; rare VPB's one VPB triplet  -----Stress test 2014 - Exercise time 10min 19 sec. Normal heart rate and blood pressure response. No evidence of ectopy or ischemia.  -----VO2 treadmill test 2019 Exercise time  10:07; VO2 was 27.5 ml/kg/min (predicted 26.1ml/kg/min); Anaerobic threshold was at VO2 22.4 (normal great 15.7) Rare PVC's in exercise and recovery. Saturation 99% at rest, 93% with exercise; chest pain during exercise  -----PVC's on Holter and treadmill testing.         Assessment and Plan   Diagnoses and all orders for this visit:    1. Tetralogy of Fallot s/p repair (Primary)    2. Palpitations  -     Thyroid Panel With TSH; Future  -     Basic Metabolic Panel; Future  -     Magnesium; Future    3. Migraine without status migrainosus, not intractable, unspecified migraine type    Other orders  -     propranolol (INDERAL) 20 MG tablet; Take 1 tablet by mouth 2 (Two) Times a Day.  Dispense: 60 tablet; Refill: 11      Extensive time during today's visit was spent in counseling the patient on the results of her cardiac monitor.  We discussed the reassuring nature of her monitor and that there were no significant cardiac arrhythmias.  While she does describe some episodes of tachycardia that seem disproportionate to her exertional level, her cardiac monitor did not meet true criteria for inappropriate sinus tachycardia.  It is also unclear whether or not anxiety/panic attacks may be the primary  of some of her symptoms, although she does not believe this is the case.  Given her symptomatic palpitations as well as her frequent migraines, I think it may be reasonable to trial a low-dose of propranolol that may provide some benefit for migraine prophylaxis as well as her tachycardia episodes.    Additionally, significant time was spent discussing the role of specialty care in adult congenital cardiology long-term.  I emphasized the importance of seeing a subspecialist in this area for more close monitoring longitudinally; I offered to try to refer her to a group that travels to our area from [place] versus having her continue to see the group and [facility].  She ultimately favored continuing to follow with  [facility] although expressed anxiety about transitioning to the adult clinic away from pediatrics.  We did assist in scheduling her next appointment in the adult congenital cardiology group there (scheduled in early June).  In the meantime, I will continue to see her for follow-up here and have offered to follow her long-term so that she can maintain a local cardiologist if she wishes.  She will need additional follow-up testing for monitoring including a potential treadmill stress test and cardiac MRI, which I will defer for now until she is seen again at [facility] (MRI would need to be performed there as we currently are referring our MRIs to [facility]).  Of note, the QRS on her EKG today appears stable compared to that in her PCPs office recently, which has not increased compared to what is reported in her most recent note from [facility].       I spent 65 minutes caring for Dayana on this date of service. This time includes time spent by me in the following activities:preparing for the visit, reviewing tests, performing a medically appropriate examination and/or evaluation , counseling and educating the patient/family/caregiver, ordering medications, tests, or procedures, documenting information in the medical record and care coordination  I spent 2 minutes on the separately reported service of ECG interpretation and documentation. This time is not included in the time used to support the E/M service also reported today.    Follow Up   Return in about 5 weeks (around 5/22/2023).  Patient was given instructions and counseling regarding her condition or for health maintenance advice. Please see specific information pulled into the AVS if appropriate.       EMR Dragon/Transcription disclaimer: Much of this encounter note is an electronic transcription/translation of spoken language to printed text. The electronic translation of spoken language may permit erroneous, or at times, nonsensical words or phrases to be  inadvertently transcribed; although I have reviewed the note for such errors, some may still exist.  or such errors, some may still exist.

## 2023-04-18 LAB
T-UPTAKE NFR SERPL: 0.99 TBI (ref 0.8–1.3)
T4 SERPL-MCNC: 6.52 MCG/DL (ref 4.5–11.7)
TSH SERPL DL<=0.05 MIU/L-ACNC: 1.29 UIU/ML (ref 0.27–4.2)

## 2023-04-19 ENCOUNTER — TELEPHONE (OUTPATIENT)
Dept: CARDIOLOGY | Facility: CLINIC | Age: 23
End: 2023-04-19
Payer: COMMERCIAL

## 2023-04-19 NOTE — TELEPHONE ENCOUNTER
The patient is called and notified of her lab result and she voices understanding.  Yessenia Armstrong MA

## 2023-04-19 NOTE — TELEPHONE ENCOUNTER
----- Message from Tonio Chung MD sent at 4/18/2023  3:30 PM CDT -----  Please notify Dayana that her labs including thyroid studies were normal. Thank you.

## 2023-09-13 ENCOUNTER — OFFICE VISIT (OUTPATIENT)
Dept: OBGYN CLINIC | Age: 23
End: 2023-09-13
Payer: COMMERCIAL

## 2023-09-13 VITALS
WEIGHT: 206 LBS | HEART RATE: 69 BPM | DIASTOLIC BLOOD PRESSURE: 65 MMHG | SYSTOLIC BLOOD PRESSURE: 113 MMHG | BODY MASS INDEX: 40.44 KG/M2 | HEIGHT: 60 IN

## 2023-09-13 DIAGNOSIS — L70.9 ACNE, UNSPECIFIED ACNE TYPE: ICD-10-CM

## 2023-09-13 DIAGNOSIS — L68.0 HIRSUTISM: ICD-10-CM

## 2023-09-13 DIAGNOSIS — N94.6 DYSMENORRHEA: ICD-10-CM

## 2023-09-13 DIAGNOSIS — E28.2 PCOS (POLYCYSTIC OVARIAN SYNDROME): ICD-10-CM

## 2023-09-13 DIAGNOSIS — R63.5 WEIGHT GAIN: ICD-10-CM

## 2023-09-13 DIAGNOSIS — Z76.89 ENCOUNTER TO ESTABLISH CARE: Primary | ICD-10-CM

## 2023-09-13 DIAGNOSIS — R79.89 ELEVATED TESTOSTERONE LEVEL: ICD-10-CM

## 2023-09-13 DIAGNOSIS — E88.81 INSULIN RESISTANCE: ICD-10-CM

## 2023-09-13 DIAGNOSIS — N92.0 MENORRHAGIA WITH REGULAR CYCLE: ICD-10-CM

## 2023-09-13 LAB
ALBUMIN SERPL-MCNC: 4.7 G/DL (ref 3.5–5.2)
ALP SERPL-CCNC: 70 U/L (ref 35–104)
ALT SERPL-CCNC: 16 U/L (ref 5–33)
ANION GAP SERPL CALCULATED.3IONS-SCNC: 13 MMOL/L (ref 7–19)
AST SERPL-CCNC: 15 U/L (ref 5–32)
BASOPHILS # BLD: 0.1 K/UL (ref 0–0.2)
BASOPHILS NFR BLD: 0.7 % (ref 0–1)
BILIRUB SERPL-MCNC: 0.6 MG/DL (ref 0.2–1.2)
BUN SERPL-MCNC: 8 MG/DL (ref 6–20)
CALCIUM SERPL-MCNC: 9.5 MG/DL (ref 8.6–10)
CHLORIDE SERPL-SCNC: 100 MMOL/L (ref 98–111)
CO2 SERPL-SCNC: 25 MMOL/L (ref 22–29)
CREAT SERPL-MCNC: 0.7 MG/DL (ref 0.5–0.9)
EOSINOPHIL # BLD: 0.1 K/UL (ref 0–0.6)
EOSINOPHIL NFR BLD: 1.2 % (ref 0–5)
ERYTHROCYTE [DISTWIDTH] IN BLOOD BY AUTOMATED COUNT: 13.1 % (ref 11.5–14.5)
FSH SERPL-SCNC: 7.8 MIU/ML
GLUCOSE SERPL-MCNC: 88 MG/DL (ref 74–109)
HCT VFR BLD AUTO: 39.5 % (ref 37–47)
HGB BLD-MCNC: 12.9 G/DL (ref 12–16)
IMM GRANULOCYTES # BLD: 0 K/UL
LH SERPL-ACNC: 11.7 MIU/ML
LYMPHOCYTES # BLD: 2.5 K/UL (ref 1.1–4.5)
LYMPHOCYTES NFR BLD: 34.1 % (ref 20–40)
MCH RBC QN AUTO: 28.9 PG (ref 27–31)
MCHC RBC AUTO-ENTMCNC: 32.7 G/DL (ref 33–37)
MCV RBC AUTO: 88.6 FL (ref 81–99)
MONOCYTES # BLD: 0.5 K/UL (ref 0–0.9)
MONOCYTES NFR BLD: 7.3 % (ref 0–10)
NEUTROPHILS # BLD: 4.2 K/UL (ref 1.5–7.5)
NEUTS SEG NFR BLD: 56.4 % (ref 50–65)
PLATELET # BLD AUTO: 357 K/UL (ref 130–400)
PMV BLD AUTO: 9.8 FL (ref 9.4–12.3)
POTASSIUM SERPL-SCNC: 3.8 MMOL/L (ref 3.5–5)
PROGEST SERPL-MCNC: 0.09 NG/ML
PROT SERPL-MCNC: 8 G/DL (ref 6.6–8.7)
RBC # BLD AUTO: 4.46 M/UL (ref 4.2–5.4)
SODIUM SERPL-SCNC: 138 MMOL/L (ref 136–145)
T4 FREE SERPL-MCNC: 1.39 NG/DL (ref 0.93–1.7)
TSH SERPL DL<=0.005 MIU/L-ACNC: 0.95 UIU/ML (ref 0.27–4.2)
WBC # BLD AUTO: 7.4 K/UL (ref 4.8–10.8)

## 2023-09-13 PROCEDURE — 99204 OFFICE O/P NEW MOD 45 MIN: CPT

## 2023-09-13 RX ORDER — BUSPIRONE HYDROCHLORIDE 5 MG/1
5 TABLET ORAL 3 TIMES DAILY
COMMUNITY
Start: 2023-04-11

## 2023-09-13 RX ORDER — SPIRONOLACTONE 25 MG/1
25 TABLET ORAL DAILY
Qty: 30 TABLET | Refills: 2 | Status: SHIPPED | OUTPATIENT
Start: 2023-09-13

## 2023-09-13 ASSESSMENT — ENCOUNTER SYMPTOMS
NAUSEA: 0
ABDOMINAL PAIN: 0
RECTAL PAIN: 0
DIARRHEA: 0
RESPIRATORY NEGATIVE: 1
GASTROINTESTINAL NEGATIVE: 1
CONSTIPATION: 0
SHORTNESS OF BREATH: 0
BACK PAIN: 0
CHEST TIGHTNESS: 0

## 2023-09-15 LAB
INSULIN FREE SERPL-ACNC: 10 UIU/ML (ref 3–25)
INSULIN SERPL-ACNC: 14 UIU/ML (ref 3–25)

## 2023-09-17 LAB
SHBG SERPL-SCNC: 22 NMOL/L (ref 25–122)
TESTOST FREE SERPL-MCNC: 5.9 PG/ML (ref 0.8–7.4)
TESTOST SERPL-MCNC: 29 NG/DL (ref 9–55)

## 2023-09-18 RX ORDER — DROSPIRENONE AND ETHINYL ESTRADIOL 0.02-3(28)
1 KIT ORAL DAILY
Qty: 1 PACKET | Refills: 11 | Status: SHIPPED | OUTPATIENT
Start: 2023-09-18

## 2023-09-18 RX ORDER — SEMAGLUTIDE 0.25 MG/.5ML
0.25 INJECTION, SOLUTION SUBCUTANEOUS
Qty: 2 ML | Refills: 0 | Status: SHIPPED | OUTPATIENT
Start: 2023-09-18

## 2023-10-09 ENCOUNTER — PATIENT MESSAGE (OUTPATIENT)
Dept: OBGYN CLINIC | Age: 23
End: 2023-10-09

## 2023-10-11 RX ORDER — LIRAGLUTIDE 6 MG/ML
INJECTION, SOLUTION SUBCUTANEOUS
Qty: 3 ML | Refills: 1 | Status: SHIPPED | OUTPATIENT
Start: 2023-10-11

## 2023-11-10 RX ORDER — SEMAGLUTIDE 0.5 MG/.5ML
0.5 INJECTION, SOLUTION SUBCUTANEOUS
Qty: 0.5 ML | Refills: 3 | Status: SHIPPED | OUTPATIENT
Start: 2023-11-10

## 2023-11-23 DIAGNOSIS — E88.819 INSULIN RESISTANCE: ICD-10-CM

## 2023-11-23 RX ORDER — ESCITALOPRAM OXALATE 10 MG/1
10 TABLET ORAL DAILY
Qty: 90 TABLET | Refills: 1 | OUTPATIENT
Start: 2023-11-23

## 2023-12-09 DIAGNOSIS — L70.9 ACNE, UNSPECIFIED ACNE TYPE: ICD-10-CM

## 2023-12-09 DIAGNOSIS — L68.0 HIRSUTISM: ICD-10-CM

## 2023-12-09 DIAGNOSIS — R79.89 ELEVATED TESTOSTERONE LEVEL: ICD-10-CM

## 2023-12-11 RX ORDER — SPIRONOLACTONE 25 MG/1
25 TABLET ORAL DAILY
Qty: 90 TABLET | Refills: 0 | Status: SHIPPED | OUTPATIENT
Start: 2023-12-11

## 2023-12-13 ENCOUNTER — OFFICE VISIT (OUTPATIENT)
Dept: OBGYN CLINIC | Age: 23
End: 2023-12-13
Payer: COMMERCIAL

## 2023-12-13 VITALS
BODY MASS INDEX: 40.64 KG/M2 | HEIGHT: 60 IN | DIASTOLIC BLOOD PRESSURE: 69 MMHG | WEIGHT: 207 LBS | HEART RATE: 81 BPM | SYSTOLIC BLOOD PRESSURE: 111 MMHG

## 2023-12-13 DIAGNOSIS — Z12.4 CERVICAL CANCER SCREENING: ICD-10-CM

## 2023-12-13 DIAGNOSIS — Z30.41 ENCOUNTER FOR SURVEILLANCE OF CONTRACEPTIVE PILLS: ICD-10-CM

## 2023-12-13 DIAGNOSIS — Z12.39 SCREENING BREAST EXAMINATION: ICD-10-CM

## 2023-12-13 DIAGNOSIS — Z01.419 ENCOUNTER FOR WELL WOMAN EXAM WITH ROUTINE GYNECOLOGICAL EXAM: Primary | ICD-10-CM

## 2023-12-13 PROCEDURE — 99395 PREV VISIT EST AGE 18-39: CPT

## 2023-12-13 NOTE — PROGRESS NOTES
Pt presents today for pap smear and breast exam.  She also complains of     Last mammogram:  never  Last pap smear:  never  Contraception:  kelle BC  :  0  Para:  0  AB:    Last bone density:  never  Last colonoscopy:   never    She is also here for a 3 month follow up. Would like to do pap today but started period today so she is not bleeding very heavy but does not want results to be messed up. BC is kelle-tried to change but it said duplicate so Lavelle Tapia is what's in there/ Also she can't find . 5 of wegovy. She does like the kelle birth control.

## 2023-12-13 NOTE — PROGRESS NOTES
[facility]  CNM Office Note    Cas Oliver is a 26 y.o. female who presents today for her medical conditions/ complaints as noted below. No chief complaint on file. Last mammogram:  never  Last pap smear:  never  Contraception:  kelle ACUÑA  :  0  Para:  0  AB:    Last bone density:  never  Last colonoscopy:   never    HPI  Cas Oliver presents today for annual exam. She is due for pap smear. This is her first pap smear. She denies family history of breast cancer. She reports her periods are much improved on OCP. She reports her menses are lighter, less painful, and not as long on OCP. She would like to continue. Her acne has also improved. She denies issues with bladder, bowel, or intercourse. Her choice of contraception is: oral contraceptives (estrogen/progesterone). She feels her sleep pattern and quality is Good. She reports that she was doing well on Wegovy but could not find the next increased dose due to supply chain issues. She is interested in trying new drug, Zepbound. Problems/Complaints today:  1. Encounter for well woman exam with routine gynecological exam  2. Cervical cancer screening  3. Screening breast examination  4. Encounter for surveillance of contraceptive pills  5. BMI 40.0-44.9, adult (HCC)  -     Tirzepatide (MOUNJARO) 2.5 MG/0.5ML SOPN SC injection; Inject 0.5 mLs into the skin once a week for 4 doses, Disp-2 mL, R-0Normal       Patient Active Problem List   Diagnosis    Irregular menses    Dysmenorrhea    Acne vulgaris    Abnormal facial hair    Tachycardia    Bleeding nose    Status post repair of tetralogy of Fallot    Other constipation    Morbid obesity ([address])       Patient's last menstrual period was 2023 (exact date). No obstetric history on file.     Past Medical History:   Diagnosis Date    History of open heart surgery     Status post repair of tetralogy of Fallot     Tachycardia      Past Surgical History:   Procedure Laterality Date    CARDIAC

## 2024-01-02 ENCOUNTER — OFFICE VISIT (OUTPATIENT)
Dept: OBGYN CLINIC | Age: 24
End: 2024-01-02
Payer: COMMERCIAL

## 2024-01-02 ENCOUNTER — PATIENT MESSAGE (OUTPATIENT)
Dept: OBGYN CLINIC | Age: 24
End: 2024-01-02

## 2024-01-02 VITALS
BODY MASS INDEX: 41.23 KG/M2 | HEIGHT: 60 IN | WEIGHT: 210 LBS | SYSTOLIC BLOOD PRESSURE: 112 MMHG | HEART RATE: 87 BPM | DIASTOLIC BLOOD PRESSURE: 72 MMHG

## 2024-01-02 DIAGNOSIS — Z12.4 CERVICAL CANCER SCREENING: Primary | ICD-10-CM

## 2024-01-02 PROCEDURE — 99213 OFFICE O/P EST LOW 20 MIN: CPT

## 2024-01-02 RX ORDER — NITROFURANTOIN 25; 75 MG/1; MG/1
100 CAPSULE ORAL 2 TIMES DAILY
COMMUNITY

## 2024-01-02 ASSESSMENT — ENCOUNTER SYMPTOMS
BACK PAIN: 0
NAUSEA: 0
CHEST TIGHTNESS: 0
SHORTNESS OF BREATH: 0
CONSTIPATION: 0
GASTROINTESTINAL NEGATIVE: 1
RESPIRATORY NEGATIVE: 1
RECTAL PAIN: 0
DIARRHEA: 0
ABDOMINAL PAIN: 0

## 2024-01-02 NOTE — TELEPHONE ENCOUNTER
From: Marleny Hernandez  To: [facility] [place]  Sent: 1/2/2024 3:01 PM CST  Subject: Zepbound    I was not able to find wegovy anywhere but I got the savings card for the zepbound. [facility] pharmacy on [address] [facility] has a couple boxes left of the 2.5mg of zepbound if you could send that to them. They are the only ones that have it! Thank you!

## 2024-01-02 NOTE — PROGRESS NOTES
[facility]  CNM Office Note    Marleny Hernandez is a 23 y.o. female who presents today for her medical conditions/ complaints as noted below.  Chief Complaint   Patient presents with    Other     HPI  Marleny presents today for pap smear. She was having heavy bleeding at [facility] and pap was unable to be completed. She wants to restart Wegovy if she can find it in stock.     Problems/Complaints today:  1. Cervical cancer screening  -     PAP SMEAR       Patient Active Problem List   Diagnosis    Irregular menses    Dysmenorrhea    Acne vulgaris    Abnormal facial hair    Tachycardia    Bleeding nose    Status post repair of tetralogy of Fallot    Other constipation    Morbid obesity (HCC)       Patient's last menstrual period was 12/12/2023 (exact date).  No obstetric history on file.    Past Medical History:   Diagnosis Date    History of open heart surgery     Status post repair of tetralogy of Fallot     Tachycardia      Past Surgical History:   Procedure Laterality Date    CARDIAC SURGERY      WISDOM TOOTH EXTRACTION Bilateral      Family History   Problem Relation Age of Onset    Anxiety Disorder Mother     Depression Mother     High Blood Pressure Mother     Bipolar Disorder Father     Depression Father     Anxiety Disorder Father     OCD Father      Social History     Tobacco Use    Smoking status: Never     Passive exposure: Never    Smokeless tobacco: Never   Substance Use Topics    Alcohol use: Never       Current Outpatient Medications   Medication Sig Dispense Refill    nitrofurantoin, macrocrystal-monohydrate, (MACROBID) 100 MG capsule Take 1 capsule by mouth 2 times daily      Tirzepatide (MOUNJARO) 2.5 MG/0.5ML SOPN SC injection Inject 0.5 mLs into the skin once a week for 4 doses 2 mL 0    spironolactone (ALDACTONE) 25 MG tablet TAKE 1 TABLET BY MOUTH EVERY DAY 90 tablet 0    Semaglutide-Weight Management (WEGOVY) 0.5 MG/0.5ML SOAJ SC injection Inject 0.5 mg into the skin every 7 days 0.5 mL 3

## 2024-01-03 RX ORDER — TIRZEPATIDE 2.5 MG/.5ML
2.5 INJECTION, SOLUTION SUBCUTANEOUS WEEKLY
Qty: 2 ML | Refills: 0 | Status: SHIPPED | OUTPATIENT
Start: 2024-01-03

## 2024-01-11 ENCOUNTER — PATIENT MESSAGE (OUTPATIENT)
Dept: OBGYN CLINIC | Age: 24
End: 2024-01-11

## 2024-01-11 RX ORDER — NITROFURANTOIN 25; 75 MG/1; MG/1
100 CAPSULE ORAL 2 TIMES DAILY
Qty: 14 CAPSULE | Refills: 0 | Status: SHIPPED | OUTPATIENT
Start: 2024-01-11 | End: 2024-01-18

## 2024-01-11 NOTE — TELEPHONE ENCOUNTER
From: Marleny Hernandez  To: [facility] [place]  Sent: 1/11/2024 8:49 AM CST  Subject: UTI    Good morning! I am sorry to bother you again but I woke up this morning with cloudy urine that smells like a UTI. It also burns a little to pee and I can only get a little out at a time without feeling like im fully finished. Is there any way you could send in something to help with that without me having to come in and be seen? I am so busy with school starting back up. If so I would be so so thankful. I also would appreciate if it could be sent to [facility] pharmacy that way I don’t have to get out as far! Let me know!

## 2024-02-12 ENCOUNTER — NURSE TRIAGE (OUTPATIENT)
Dept: CALL CENTER | Facility: HOSPITAL | Age: 24
End: 2024-02-12
Payer: COMMERCIAL

## 2024-03-10 DIAGNOSIS — R79.89 ELEVATED TESTOSTERONE LEVEL: ICD-10-CM

## 2024-03-10 DIAGNOSIS — L70.9 ACNE, UNSPECIFIED ACNE TYPE: ICD-10-CM

## 2024-03-10 DIAGNOSIS — L68.0 HIRSUTISM: ICD-10-CM

## 2024-03-11 RX ORDER — SPIRONOLACTONE 25 MG/1
25 TABLET ORAL DAILY
Qty: 90 TABLET | Refills: 0 | Status: SHIPPED | OUTPATIENT
Start: 2024-03-11

## 2025-06-09 SDOH — ECONOMIC STABILITY: FOOD INSECURITY: WITHIN THE PAST 12 MONTHS, THE FOOD YOU BOUGHT JUST DIDN'T LAST AND YOU DIDN'T HAVE MONEY TO GET MORE.: NEVER TRUE

## 2025-06-09 SDOH — ECONOMIC STABILITY: INCOME INSECURITY: IN THE LAST 12 MONTHS, WAS THERE A TIME WHEN YOU WERE NOT ABLE TO PAY THE MORTGAGE OR RENT ON TIME?: NO

## 2025-06-09 SDOH — ECONOMIC STABILITY: FOOD INSECURITY: WITHIN THE PAST 12 MONTHS, YOU WORRIED THAT YOUR FOOD WOULD RUN OUT BEFORE YOU GOT MONEY TO BUY MORE.: NEVER TRUE

## 2025-06-09 SDOH — ECONOMIC STABILITY: TRANSPORTATION INSECURITY
IN THE PAST 12 MONTHS, HAS THE LACK OF TRANSPORTATION KEPT YOU FROM MEDICAL APPOINTMENTS OR FROM GETTING MEDICATIONS?: NO

## 2025-06-09 SDOH — ECONOMIC STABILITY: TRANSPORTATION INSECURITY
IN THE PAST 12 MONTHS, HAS LACK OF TRANSPORTATION KEPT YOU FROM MEETINGS, WORK, OR FROM GETTING THINGS NEEDED FOR DAILY LIVING?: NO

## 2025-06-09 ASSESSMENT — PATIENT HEALTH QUESTIONNAIRE - PHQ9
1. LITTLE INTEREST OR PLEASURE IN DOING THINGS: SEVERAL DAYS
2. FEELING DOWN, DEPRESSED OR HOPELESS: SEVERAL DAYS
SUM OF ALL RESPONSES TO PHQ QUESTIONS 1-9: 2

## 2025-06-10 ENCOUNTER — OFFICE VISIT (OUTPATIENT)
Dept: OBGYN CLINIC | Age: 25
End: 2025-06-10
Payer: COMMERCIAL

## 2025-06-10 VITALS
BODY MASS INDEX: 41.62 KG/M2 | SYSTOLIC BLOOD PRESSURE: 110 MMHG | WEIGHT: 212 LBS | HEART RATE: 87 BPM | DIASTOLIC BLOOD PRESSURE: 76 MMHG | HEIGHT: 60 IN

## 2025-06-10 DIAGNOSIS — F40.232 FEAR OF OTHER MEDICAL CARE: ICD-10-CM

## 2025-06-10 DIAGNOSIS — Z30.014 ENCOUNTER FOR INITIAL PRESCRIPTION OF INTRAUTERINE CONTRACEPTIVE DEVICE (IUD): ICD-10-CM

## 2025-06-10 DIAGNOSIS — Z71.3 WEIGHT LOSS COUNSELING, ENCOUNTER FOR: ICD-10-CM

## 2025-06-10 DIAGNOSIS — E66.813 CLASS 3 SEVERE OBESITY WITH BODY MASS INDEX (BMI) OF 40.0 TO 44.9 IN ADULT, UNSPECIFIED OBESITY TYPE, UNSPECIFIED WHETHER SERIOUS COMORBIDITY PRESENT (HCC): ICD-10-CM

## 2025-06-10 PROCEDURE — 99214 OFFICE O/P EST MOD 30 MIN: CPT

## 2025-06-10 RX ORDER — LUMATEPERONE 42 MG/1
CAPSULE ORAL
COMMUNITY
Start: 2025-05-27

## 2025-06-10 RX ORDER — TRAZODONE HYDROCHLORIDE 150 MG/1
TABLET ORAL
COMMUNITY
Start: 2025-04-22

## 2025-06-10 RX ORDER — TRAZODONE HYDROCHLORIDE 100 MG/1
TABLET ORAL
COMMUNITY
Start: 2025-05-27

## 2025-06-10 ASSESSMENT — PATIENT HEALTH QUESTIONNAIRE - PHQ9
1. LITTLE INTEREST OR PLEASURE IN DOING THINGS: SEVERAL DAYS
2. FEELING DOWN, DEPRESSED OR HOPELESS: SEVERAL DAYS
SUM OF ALL RESPONSES TO PHQ9 QUESTIONS 1 & 2: 2

## 2025-06-10 NOTE — PROGRESS NOTES
[facility]  CNM Office Note    Marleny Hernandez is a 24 y.o. female who presents today for her medical conditions/ complaints as noted below.  Chief Complaint   Patient presents with    Other     HPI  History of Present Illness  The patient presents for weight management, PCOS, bipolar disorder, and irregular menstrual cycles.    She expresses a desire to initiate Mounjaro treatment for weight management. She has been experiencing irregular menstrual cycles and has discontinued her metformin regimen. She is uncertain about the compatibility of Mounjaro with metformin. She reports no history of pancreatitis or thyroid cancer in her family. Previously, she used Sarai for birth control but discontinued it due to weight gain and mood swings, which she attributes to her undiagnosed bipolar disorder. She is considering the use of an intrauterine device (IUD) for birth control and is curious about its potential impact on her weight and menstrual cycle. She is also interested in understanding the process of IUD insertion and whether it can be done during her upcoming well-woman visit on 07/09/2025. She has never had a baby before.    She has been diagnosed with bipolar disorder and was prescribed Caplyta, fluoxetine, and trazodone for sleep. She experienced a week-long period where she was unable to refill her medications at [facility], during which she had a distressing episode while driving. She has since switched to [facility] for her medication refills.    She has been experiencing acne and hair growth in undesirable areas, which she finds distressing. She discontinued spironolactone due to difficulties in obtaining refills.    Her current weight is 214 pounds, up from 205 pounds a month ago. Despite a low appetite, consuming only 1 to 2 meals a day, and maintaining an active lifestyle through constant walking at work and regular exercise, she has been struggling with weight loss. She is concerned about the potential

## 2025-06-10 NOTE — PROGRESS NOTES
Pt presents today to discuss possibly re-starting mounjaro. States it was very hard to get it so she quit taking it with the shortage. Would also like to discuss possibly getting an IUD. She would also like to discuss the sprinolacotone or the metformin. States her PCOS has gotten worse. She has been working out a lot and eating less and she has been struggling to lose weight. States last month she weighed 205 and hasn't been below 200 no matter what she does.

## 2025-06-12 RX ORDER — SPIRONOLACTONE 100 MG/1
100 TABLET, FILM COATED ORAL DAILY
Qty: 90 TABLET | Refills: 1 | Status: SHIPPED | OUTPATIENT
Start: 2025-06-12

## 2025-06-16 ENCOUNTER — TELEPHONE (OUTPATIENT)
Dept: OBGYN CLINIC | Age: 25
End: 2025-06-16

## 2025-06-16 RX ORDER — MISOPROSTOL 100 UG/1
TABLET ORAL
Qty: 4 TABLET | Refills: 0 | Status: SHIPPED | OUTPATIENT
Start: 2025-06-16

## 2025-06-16 RX ORDER — HYDROCODONE BITARTRATE AND ACETAMINOPHEN 5; 325 MG/1; MG/1
1 TABLET ORAL EVERY 6 HOURS PRN
Qty: 2 TABLET | Refills: 0 | Status: SHIPPED | OUTPATIENT
Start: 2025-06-16 | End: 2025-06-18

## 2025-06-16 RX ORDER — DIAZEPAM 5 MG/1
10 TABLET ORAL ONCE
Qty: 2 TABLET | Refills: 0 | Status: SHIPPED | OUTPATIENT
Start: 2025-06-16 | End: 2025-06-16

## 2025-06-16 ASSESSMENT — ENCOUNTER SYMPTOMS
BACK PAIN: 0
GASTROINTESTINAL NEGATIVE: 1
DIARRHEA: 0
CONSTIPATION: 0
CHEST TIGHTNESS: 0
SHORTNESS OF BREATH: 0
RESPIRATORY NEGATIVE: 1
NAUSEA: 0
ABDOMINAL PAIN: 0
RECTAL PAIN: 0

## 2025-06-16 NOTE — TELEPHONE ENCOUNTER
----- Message from TREVON Azevedo CNP sent at 6/16/2025 11:28 AM CDT -----  Please order mirena for patient

## 2025-07-09 ENCOUNTER — OFFICE VISIT (OUTPATIENT)
Dept: OBGYN CLINIC | Age: 25
End: 2025-07-09

## 2025-07-09 VITALS
DIASTOLIC BLOOD PRESSURE: 83 MMHG | WEIGHT: 209 LBS | HEIGHT: 60 IN | BODY MASS INDEX: 41.03 KG/M2 | HEART RATE: 76 BPM | SYSTOLIC BLOOD PRESSURE: 126 MMHG

## 2025-07-09 DIAGNOSIS — Z30.430 ENCOUNTER FOR IUD INSERTION: ICD-10-CM

## 2025-07-09 DIAGNOSIS — Z11.51 SCREENING FOR HPV (HUMAN PAPILLOMAVIRUS): ICD-10-CM

## 2025-07-09 DIAGNOSIS — Z12.39 ENCOUNTER FOR SCREENING BREAST EXAMINATION: ICD-10-CM

## 2025-07-09 DIAGNOSIS — Z12.4 SCREENING FOR CERVICAL CANCER: ICD-10-CM

## 2025-07-09 DIAGNOSIS — Z01.419 WELL WOMAN EXAM WITH ROUTINE GYNECOLOGICAL EXAM: Primary | ICD-10-CM

## 2025-07-09 LAB
CONTROL: NORMAL
PREGNANCY TEST URINE, POC: NORMAL

## 2025-07-09 NOTE — PROGRESS NOTES
Pt presents today for pap smear and breast exam.  She also here for iud insertion, she does mention taking morning after pill on Sat.     Last mammogram:  never  Last pap smear:  2024  Contraception, tubal, vasectomy or condoms:  condoms  :  0  Para:  0  AB:  0  Last bone density:  never  Last colonoscopy:   never  Sexual Active: yes    
capsule 0     No current facility-administered medications for this visit.     Allergies   Allergen Reactions    Pseudoephedrine Hcl      Other reaction(s): contraindicated d/t heart     Vitals:    07/09/25 1447 07/09/25 1504   BP:  126/83   Pulse:  76   Weight:  94.8 kg (209 lb)   Height: 1.524 m (5')      Body mass index is 40.82 kg/m².    Review of Systems   Constitutional: Negative.  Negative for activity change, fever and unexpected weight change.   Respiratory: Negative.  Negative for chest tightness and shortness of breath.    Cardiovascular: Negative.  Negative for chest pain.   Gastrointestinal: Negative.  Negative for abdominal pain, constipation, diarrhea, nausea and rectal pain.   Genitourinary:  Positive for menstrual problem. Negative for difficulty urinating, dyspareunia, frequency, genital sores, pelvic pain, vaginal discharge and vaginal pain.   Musculoskeletal: Negative.  Negative for back pain and gait problem.   Skin: Negative.    Neurological: Negative.  Negative for dizziness and headaches.   Psychiatric/Behavioral: Negative.  Negative for behavioral problems, self-injury and suicidal ideas. The patient is not nervous/anxious.        Physical Exam  Vitals and nursing note reviewed.   Constitutional:       Appearance: She is well-developed.   HENT:      Head: Normocephalic and atraumatic.   Eyes:      Conjunctiva/sclera: Conjunctivae normal.      Pupils: Pupils are equal, round, and reactive to light.   Neck:      Thyroid: No thyromegaly.      Trachea: No tracheal deviation.   Cardiovascular:      Rate and Rhythm: Normal rate and regular rhythm.      Heart sounds: Normal heart sounds. No murmur heard.  Pulmonary:      Effort: Pulmonary effort is normal. No respiratory distress.      Breath sounds: Normal breath sounds.   Chest:      Comments: Breasts symmetrical without tenderness, masses, or nipple discharge. Nipples everted bilaterally.    Abdominal:      General: There is no distension.

## 2025-07-09 NOTE — PATIENT INSTRUCTIONS
intrauterine system will not protect you from sexually transmitted diseases, including HIV and AIDS. Using a condom may help protect from these diseases.  Call your doctor if your sex partner develops HIV or a sexually transmitted disease, or if you have any change in sexual relationships.  What are the possible side effects of levonorgestrel intrauterine system?  Get emergency medical help if you have signs of an allergic reaction: hives; difficult breathing; swelling of your face, lips, tongue, or throat.  Get emergency medical help if you have severe pain in your lower stomach or side. This could be a sign of a tubal pregnancy.  The IUD may become embedded into the wall of the uterus, or may perforate (form a hole) in the uterus.  If this occurs, the device may no longer prevent pregnancy, or it may move outside the uterus and cause scarring, infection, or damage to other organs. Your doctor may need to surgically remove the device.  Call your doctor at once if you have:  severe cramps or pelvic pain, pain during sexual intercourse;  extreme dizziness or light-headed feeling;  severe migraine headache;  heavy or ongoing vaginal bleeding, vaginal sores, vaginal discharge that is watery, foul-smelling discharge, or otherwise unusual;  pale skin, weakness, easy bruising or bleeding, fever, chills, or other signs of infection;  jaundice (yellowing of the skin or eyes); or  sudden numbness or weakness (especially on one side of the body), confusion, problems with vision, sensitivity to light.  Common side effects may include:  pelvic pain, painful or irregular menstrual periods, changes in bleeding patterns or flow;  vaginal swelling, itching or infection;  temporary pain, bleeding, or dizziness during insertion of the IUD;  ovarian cysts (pelvic pain that disappears within 3 months);  stomach pain, nausea, vomiting, bloating;  headache, migraine, depression, mood changes;  back pain, breast tenderness or

## 2025-07-10 ASSESSMENT — ENCOUNTER SYMPTOMS
SHORTNESS OF BREATH: 0
DIARRHEA: 0
CHEST TIGHTNESS: 0
BACK PAIN: 0
ABDOMINAL PAIN: 0
GASTROINTESTINAL NEGATIVE: 1
NAUSEA: 0
RECTAL PAIN: 0
RESPIRATORY NEGATIVE: 1
CONSTIPATION: 0

## 2025-08-01 ENCOUNTER — APPOINTMENT (OUTPATIENT)
Dept: GENERAL RADIOLOGY | Facility: HOSPITAL | Age: 25
End: 2025-08-01
Payer: COMMERCIAL

## 2025-08-01 ENCOUNTER — HOSPITAL ENCOUNTER (EMERGENCY)
Facility: HOSPITAL | Age: 25
Discharge: HOME | End: 2025-08-01
Payer: COMMERCIAL

## 2025-08-01 VITALS
TEMPERATURE: 98.2 F | SYSTOLIC BLOOD PRESSURE: 136 MMHG | OXYGEN SATURATION: 98 % | BODY MASS INDEX: 42.21 KG/M2 | HEART RATE: 74 BPM | WEIGHT: 215 LBS | DIASTOLIC BLOOD PRESSURE: 68 MMHG | HEIGHT: 60 IN | RESPIRATION RATE: 18 BRPM

## 2025-08-01 DIAGNOSIS — R13.19 OTHER DYSPHAGIA: Primary | ICD-10-CM

## 2025-08-01 PROCEDURE — 74220 X-RAY XM ESOPHAGUS 1CNTRST: CPT

## 2025-08-01 PROCEDURE — 99284 EMERGENCY DEPT VISIT MOD MDM: CPT

## 2025-08-01 RX ORDER — OMEPRAZOLE 20 MG/1
20 CAPSULE, DELAYED RELEASE ORAL DAILY
Qty: 20 CAPSULE | Refills: 0 | Status: SHIPPED | OUTPATIENT
Start: 2025-08-01 | End: 2025-08-21

## 2025-08-01 RX ADMIN — ANTACID/ANTIFLATULENT 1 PACKET: 380; 550; 10; 10 GRANULE, EFFERVESCENT ORAL at 10:09

## 2025-08-01 RX ADMIN — BARIUM SULFATE 120 ML: 980 POWDER, FOR SUSPENSION ORAL at 10:09

## 2025-08-01 NOTE — DISCHARGE INSTRUCTIONS
It was very nice to meet you, Dayana. Thank you for allowing us to take care of you today at [facility].    Follow-up with primary care doctor.  I did send in some omeprazole to the pharmacy.  Follow-up with GI as well.  Return to the ER with any new or worsening symptoms.    Please understand that an ER evaluation is just the start of your evaluation. We will do what we can, but we are often unable to fully figure out what is causing your symptoms from one evaluation. Thus, our primary goal is to determine whether you need to be evaluated in the hospital or if it is safe for you to go home and see other doctors such as a primary care physician or a specialist on an outpatient basis.     Like we discussed, it is VERY IMPORTANT that you follow up with your primary care doctor (call them to set up an appointment) within the next few days or as soon as possible so that you can be re-evaluated for improvement in your symptoms or for any other questions.     Please return to the emergency room within 12-48 hours if you experience fever, chills, chest pain or shortness of breath, pain with inspiration/expiration, pain that travels to your arms, neck or back, nausea, vomiting, severe headache, tearing pain in your chest, dizziness, feel as though you are about to pass out, have any worsening symptoms, or any other concerns.

## 2025-08-01 NOTE — ED PROVIDER NOTES
Subjective   History of Present Illness  Patient is a 24-year-old female who presents to the emergency department today for complaint of difficulty swallowing and a cough.  Patient states that over the last few weeks and months she has been feeling like it has been difficult for her to swallow and she has been having a cough frequently and this is mostly after eating.  She states that last night she ate at work, she ate a BLT and some fries.  She states that she went to eat another snack about 5 or 10 minutes after she finished her meal and she vomited.  She states that ever since then she has felt like something is stuck in her throat that she cannot get it up.  She states that she then ate the second snack, which was cheesecake, she has not vomited since then but she has been feeling like something is just sitting in the top of her throat and she cannot get it up.  She states that she went to urgent care and was instructed to come here for further evaluation.  Denies any chest pain, shortness of breath, fever, chills, and any other symptoms.  Past medical history of acne, anxiety, chronic constipation, depression, PCOS, and tetralogy of fallot.           Review of Systems   HENT:  Positive for sore throat.    Respiratory:  Positive for cough.    All other systems reviewed and are negative.      Past Medical History:   Diagnosis Date    Acne     Anxiety     Chronic constipation     Depression     PCOS (polycystic ovarian syndrome)     Tetralogy of Fallot        Allergies   Allergen Reactions    Pseudoephedrine Other (See Comments)     Other reaction(s): contraindicated d/t heart       Past Surgical History:   Procedure Laterality Date    CARDIAC SURGERY      Repair of tetralogy of Fallot    WISDOM TOOTH EXTRACTION         Family History   Problem Relation Age of Onset    Hypertension Mother     Diabetes Cousin        Social History     Socioeconomic History    Marital status: Single   Tobacco Use    Smoking status:  Never    Smokeless tobacco: Never   Vaping Use    Vaping status: Never Used   Substance and Sexual Activity    Alcohol use: Never    Drug use: Never    Sexual activity: Defer           Objective   Physical Exam  Vitals and nursing note reviewed.   Constitutional:       General: She is not in acute distress.     Appearance: Normal appearance. She is obese. She is not ill-appearing, toxic-appearing or diaphoretic.   HENT:      Head: Normocephalic and atraumatic.      Right Ear: Tympanic membrane, ear canal and external ear normal. There is no impacted cerumen.      Left Ear: Tympanic membrane, ear canal and external ear normal. There is no impacted cerumen.      Nose: Nose normal. No congestion or rhinorrhea.      Mouth/Throat:      Mouth: Mucous membranes are moist.      Pharynx: Oropharynx is clear. No oropharyngeal exudate or posterior oropharyngeal erythema.      Comments: Patient is swallowing secretions appropriately, there is no exudate or erythema noted to the tonsils, no uvula swelling, no abscesses, no angioedema, no tenderness.  Eyes:      Extraocular Movements: Extraocular movements intact.      Conjunctiva/sclera: Conjunctivae normal.      Pupils: Pupils are equal, round, and reactive to light.   Neck:      Comments: No cervical lymphadenopathy, no rigidity, no movement pain, no decreased range of motion.  Cardiovascular:      Rate and Rhythm: Normal rate and regular rhythm.      Pulses: Normal pulses.      Heart sounds: Normal heart sounds. No murmur heard.     No gallop.   Pulmonary:      Effort: Pulmonary effort is normal. No respiratory distress.      Breath sounds: Normal breath sounds. No stridor. No wheezing, rhonchi or rales.      Comments: No shortness of breath, no stridor, no wheezing, no rhonchi, no rales, no decreased breath sounds, no decreased air movement.  Chest:      Chest wall: No tenderness.   Abdominal:      General: Abdomen is flat and protuberant. Bowel sounds are normal. There is  no distension.      Palpations: Abdomen is soft. There is no mass.      Tenderness: There is no abdominal tenderness. There is no right CVA tenderness, left CVA tenderness, guarding or rebound.      Hernia: No hernia is present.      Comments: Abdomen soft, nontender, nondistended, no masses or hernias, no guarding or rebound, no CVA tenderness.   Musculoskeletal:         General: No swelling, tenderness, deformity or signs of injury. Normal range of motion.      Cervical back: Normal range of motion and neck supple. No rigidity or tenderness.      Right lower leg: No edema.      Left lower leg: No edema.   Lymphadenopathy:      Cervical: No cervical adenopathy.   Skin:     General: Skin is warm and dry.      Capillary Refill: Capillary refill takes less than 2 seconds.      Coloration: Skin is not jaundiced or pale.      Findings: No bruising, erythema, lesion or rash.   Neurological:      General: No focal deficit present.      Mental Status: She is alert and oriented to person, place, and time. Mental status is at baseline.      GCS: GCS eye subscore is 4. GCS verbal subscore is 5. GCS motor subscore is 6.      Cranial Nerves: Cranial nerves 2-12 are intact. No cranial nerve deficit.      Sensory: Sensation is intact. No sensory deficit.      Motor: Motor function is intact. No weakness.      Coordination: Coordination is intact. Coordination normal.      Gait: Gait is intact. Gait normal.      Deep Tendon Reflexes: Reflexes are normal and symmetric. Reflexes normal.   Psychiatric:         Mood and Affect: Mood normal.         Behavior: Behavior normal.         Thought Content: Thought content normal.         Judgment: Judgment normal.         Procedures           ED Course  ED Course as of 08/01/25 1550   Fri Aug 01, 2025   0920 Discussed case with Dr. Monte, he instructed me to offer the patient a barium study, patient requested this and this was ordered. [BS]   1049 FL esophagram single  contrast  IMPRESSION:  1. Normal esophagram.  2. Fluoroscopy time: 1 minute 16 seconds.  3. The radiation dose (cumulative air kerma): 90.2mGy.  4. Number of images: 34.   [BS]   1111 Discussed case with Dr. Monte.  Patient is stable for discharge at this time.  On reassessment patient is still able to swallow her secretions and her coughing has decreased, she states that she is feeling some better.  I will have the patient follow-up with primary care doctor in the next couple of days as well as GI for her symptoms.  I will instruct her to try omeprazole, this was sent into the pharmacy.  I will instruct her to return to the ER with any new or worsening symptoms.  She was in agreement with care plan and will be discharged home in a stable condition. [BS]      ED Course User Index  [BS] Maria E Mclean, ELIZABETH                                                       Medical Decision Making  Problems Addressed:  Other dysphagia: complicated acute illness or injury    Amount and/or Complexity of Data Reviewed  Radiology: ordered.    Risk  OTC drugs.  Prescription drug management.    Patient is a 24-year-old female who presents to the emergency department today for complaint of difficulty swallowing and a cough.  Patient states that over the last few weeks and months she has been feeling like it has been difficult for her to swallow and she has been having a cough frequently and this is mostly after eating.  She states that last night she ate at work, she ate a BLT and some fries.  She states that she went to eat another snack about 5 or 10 minutes after she finished her meal and she vomited.  She states that ever since then she has felt like something is stuck in her throat that she cannot get it up.  She states that she then ate the second snack, which was cheesecake, she has not vomited since then but she has been feeling like something is just sitting in the top of her throat and she cannot get it up.  She states that she  went to urgent care and was instructed to come here for further evaluation.  Denies any chest pain, shortness of breath, fever, chills, and any other symptoms.  Past medical history of acne, anxiety, chronic constipation, depression, PCOS, and tetralogy of fallot.     Differential diagnoses include but are not limited to food bolus, stricture, strep throat, GERD, and other etiologies.    FL ESOPHAGRAM SINGLE CONTRAST   Final Result   1. Normal esophagram.   2. Fluoroscopy time: 1 minute 16 seconds.   3. The radiation dose (cumulative air kerma): 90.2mGy.   4. Number of images: 34.           This report was signed and finalized on 8/1/2025 10:40 AM by Dr. Betty Alford MD.             Patient is a 24-year-old female who presents to the emergency department today for complaint of difficulty swallowing and a cough.  Patient states that over the last few weeks and months she has been feeling like it has been difficult for her to swallow and she has been having a cough frequently and this is mostly after eating.  She states that last night she ate at work, she ate a BLT and some fries.  She states that she went to eat another snack about 5 or 10 minutes after she finished her meal and she vomited.  She states that ever since then she has felt like something is stuck in her throat that she cannot get it up.  She states that she then ate the second snack, which was cheesecake, she has not vomited since then but she has been feeling like something is just sitting in the top of her throat and she cannot get it up.  She states that she went to urgent care and was instructed to come here for further evaluation.  On exam she is in no acute distress, normal appearing, she is obese, not ill-appearing, nontoxic-appearing, nondiaphoretic.  Patient is swallowing secretions appropriately, there is no exudate or erythema noted to the tonsils, no uvula swelling, no abscesses, no angioedema, no tenderness. No cervical  lymphadenopathy, no rigidity, no movement pain, no decreased range of motion. No shortness of breath, no stridor, no wheezing, no rhonchi, no rales, no decreased breath sounds, no decreased air movement. Abdomen soft, nontender, nondistended, no masses or hernias, no guarding or rebound, no CVA tenderness.  Otherwise exam is unremarkable. Discussed case with Dr. Monte, he instructed me to offer the patient a barium study, patient requested this and this was ordered.  FL esophagram single contrast  Normal esophagram. Fluoroscopy time: 1 minute 16 seconds.  The radiation dose (cumulative air kerma): 90.2mGy. Number of images: 34. Discussed case with Dr. Monte.  Patient is stable for discharge at this time.  On reassessment patient is still able to swallow her secretions and her coughing has decreased, she states that she is feeling some better.  I will have the patient follow-up with primary care doctor in the next couple of days as well as GI for her symptoms.  I will instruct her to try omeprazole, this was sent into the pharmacy.  I will instruct her to return to the ER with any new or worsening symptoms.  She was in agreement with care plan and will be discharged home in a stable condition.    Final diagnoses:   Other dysphagia       ED Disposition  ED Disposition       ED Disposition   Discharge    Condition   Stable    Comment   --               [facility] EMERGENCY DEPARTMENT  [facility] [place] [id number] [phone]    If symptoms worsen, As needed    PATIENT CONNECTION - [place] [address]  [phone]  Today  follow up    Antonio Sanderson MD  [address] [place] [address]  [phone]    Schedule an appointment as soon as possible for a visit   follow up         Medication List        New Prescriptions      omeprazole 20 MG capsule  Commonly known as: priLOSEC  Take 1 capsule by mouth Daily for 20 days.               Where to Get Your  Medications        These medications were sent to [id number] - [place], [place] - [address] AT [facility] [address] - [phone]  - [phone] FX  [address], [facility] [address]      Phone: [phone]   omeprazole 20 MG capsule            Maria E Mclean, APRN  08/01/25 4335

## 2025-08-18 PROCEDURE — 87636 SARSCOV2 & INF A&B AMP PRB: CPT
